# Patient Record
Sex: MALE | Race: WHITE
[De-identification: names, ages, dates, MRNs, and addresses within clinical notes are randomized per-mention and may not be internally consistent; named-entity substitution may affect disease eponyms.]

---

## 2021-06-18 ENCOUNTER — HOSPITAL ENCOUNTER (OUTPATIENT)
Dept: HOSPITAL 38 - CC.SDS | Age: 71
End: 2021-06-18
Attending: FAMILY MEDICINE
Payer: MEDICARE

## 2021-06-18 DIAGNOSIS — K64.8: ICD-10-CM

## 2021-06-18 DIAGNOSIS — K62.5: ICD-10-CM

## 2021-06-18 DIAGNOSIS — K57.30: ICD-10-CM

## 2021-06-18 DIAGNOSIS — Z88.8: ICD-10-CM

## 2021-06-18 DIAGNOSIS — Z79.899: ICD-10-CM

## 2021-06-18 DIAGNOSIS — I25.2: ICD-10-CM

## 2021-06-18 DIAGNOSIS — I10: ICD-10-CM

## 2021-06-18 DIAGNOSIS — D12.0: ICD-10-CM

## 2021-06-18 DIAGNOSIS — D12.2: ICD-10-CM

## 2021-06-18 DIAGNOSIS — Z87.891: ICD-10-CM

## 2021-06-18 DIAGNOSIS — E55.9: ICD-10-CM

## 2021-06-18 DIAGNOSIS — Z79.82: ICD-10-CM

## 2021-06-18 DIAGNOSIS — Z12.11: Primary | ICD-10-CM

## 2021-06-18 DIAGNOSIS — E78.5: ICD-10-CM

## 2021-06-18 DIAGNOSIS — Z90.49: ICD-10-CM

## 2021-06-18 DIAGNOSIS — R35.1: ICD-10-CM

## 2021-06-18 DIAGNOSIS — N40.1: ICD-10-CM

## 2021-06-18 NOTE — OR
DATE OF OPERATION:  06/18/2021

 

PREOPERATIVE DIAGNOSIS:  HISTORY OF POLYPS.

 

POSTOPERATIVE DIAGNOSIS:  HISTORY OF POLYPS.

 

SURGEON:  Teddy Weir MD

 

PROCEDURE:  FULL-LENGTH COLONOSCOPY WITH SNARE POLYPECTOMY X2, FORCEPS POLYP

REMOVAL X3.

 

ANESTHESIA:  MAC.

 

COMPLICATIONS:  None.

 

SPECIMEN:

1. Villous polyp of cecum.

2. Small tubular adenoma, proximal ascending colon.

3. Rectosigmoid polyp.

4. Three small hyperplastic polyps in rectal vault.

 

FINDINGS:

1. Full-length surveillance colonoscopy.

2. Large villous lesion, cecal pouch.

3. Small tubular adenoma, ascending colon.

4. Tubular adenoma, rectosigmoid junction.

5. Hyperplastic polyps x2, rectal vault.

6. Pandiverticulosis, moderate in the sigmoid colon.

 

RECOMMENDATIONS:  The patient's larger flat villous lesion in the cecum will

need definitive care, appears to be benign, but due to its size and it occupies

quite a bit of mucosa, recommend surgical consultation with Dr. Torres.

 

INDICATIONS:  The patient is well overdue for a prior scope.  He believes his

last scope was 12 or 13 years ago where he had a couple of polyps removed.  He

is in for a followup in that regard.

 

DESCRIPTION OF PROCEDURE:  The patient was prepped and draped, placed in the

left lateral decubitus position.  A lubricated Olympus colonoscope was inserted

and easily advanced to the cecum. Direct visualization of the ileocecal valve

and appendiceal orifice was accomplished.  The bowel prep was adequate.  Upon

withdrawal of the scope right in the cecal pouch along the haustral fold, the

patient had a large bulky or villous lesion, appears benign, but just due to its

sheer size was difficult to remove.  We took out 4 separate sections with a

snare, and when we got down to its base, there was a significant amount of

mucosal lining occupied, felt it was safe to leave this for definitive surgical

care.  Just past that in the immediate proximal ascending colon, the patient had

another small tubular adenoma removed with a snare and suctioned into polyp trap

#2.  The rest of the ascending and transverse colons appeared benign.  The

patient does have pandiverticular disease, very mild in the right and transverse

colon, but he does have moderate disease in the sigmoid and rectosigmoid areas.

In the rectosigmoid region around 20 cm, the patient had another small tubular

adenoma removed with a forceps in its entirety.  In the proximal rectal vault,

the patient did have a small cluster of hyperplastic polyps.  There were 3

identified and all removed with forceps without any complication.  Retroflexion

of the scope in the rectum showed some perianal hemorrhoid disease, otherwise

unremarkable.  Air was then suctioned.  The scope removed without complication.

 

PHYLLIS/MISTY

DD:  06/18/2021 09:01:08

DT:  06/18/2021 14:34:57

Job #:  [id number]

## 2023-07-21 ENCOUNTER — HOSPITAL ENCOUNTER (OUTPATIENT)
Dept: HOSPITAL 38 - CC.SDS | Age: 73
End: 2023-07-21
Attending: FAMILY MEDICINE
Payer: MEDICARE

## 2023-07-21 DIAGNOSIS — Z79.82: ICD-10-CM

## 2023-07-21 DIAGNOSIS — K57.30: ICD-10-CM

## 2023-07-21 DIAGNOSIS — K64.9: ICD-10-CM

## 2023-07-21 DIAGNOSIS — Z12.11: Primary | ICD-10-CM

## 2023-07-21 DIAGNOSIS — K63.5: ICD-10-CM

## 2023-07-21 DIAGNOSIS — Z79.899: ICD-10-CM
